# Patient Record
Sex: MALE | Race: WHITE | URBAN - METROPOLITAN AREA
[De-identification: names, ages, dates, MRNs, and addresses within clinical notes are randomized per-mention and may not be internally consistent; named-entity substitution may affect disease eponyms.]

---

## 2018-05-03 ENCOUNTER — OFFICE VISIT (OUTPATIENT)
Dept: URGENT CARE | Facility: URGENT CARE | Age: 38
End: 2018-05-03
Payer: COMMERCIAL

## 2018-05-03 VITALS
TEMPERATURE: 98.7 F | HEART RATE: 70 BPM | WEIGHT: 245.9 LBS | OXYGEN SATURATION: 99 % | SYSTOLIC BLOOD PRESSURE: 149 MMHG | DIASTOLIC BLOOD PRESSURE: 97 MMHG | RESPIRATION RATE: 16 BRPM

## 2018-05-03 DIAGNOSIS — T78.40XA ALLERGIC REACTION, INITIAL ENCOUNTER: Primary | ICD-10-CM

## 2018-05-03 DIAGNOSIS — H02.843 SWOLLEN EYELID, RIGHT: ICD-10-CM

## 2018-05-03 PROCEDURE — 99203 OFFICE O/P NEW LOW 30 MIN: CPT | Performed by: PHYSICIAN ASSISTANT

## 2018-05-03 RX ORDER — FLUTICASONE PROPIONATE 50 MCG
2 SPRAY, SUSPENSION (ML) NASAL DAILY
Qty: 1 BOTTLE | Refills: 1 | Status: SHIPPED | OUTPATIENT
Start: 2018-05-03

## 2018-05-03 RX ORDER — CETIRIZINE HYDROCHLORIDE 10 MG/1
10 TABLET ORAL EVERY EVENING
Qty: 30 TABLET | Refills: 1 | Status: SHIPPED | OUTPATIENT
Start: 2018-05-03

## 2018-05-03 NOTE — MR AVS SNAPSHOT
"              After Visit Summary   5/3/2018    Palmer Marrero    MRN: [id number]           Patient Information     Date Of Birth          1980        Visit Information        Provider Department      5/3/2018 2:10 PM Saúl Zaman PA-C [facility]        Today's Diagnoses     Allergic reaction, initial encounter    -  1    Swollen eyelid, right           Follow-ups after your visit        Who to contact     If you have questions or need follow up information about today's clinic visit or your schedule please contact [facility] directly at [phone].  Normal or non-critical lab and imaging results will be communicated to you by MyChart, letter or phone within 4 business days after the clinic has received the results. If you do not hear from us within 7 days, please contact the clinic through V[employer] or phone. If you have a critical or abnormal lab result, we will notify you by phone as soon as possible.  Submit refill requests through [employer] or call your pharmacy and they will forward the refill request to us. Please allow 3 business days for your refill to be completed.          Additional Information About Your Visit        MyChart Information     [employer] lets you send messages to your doctor, view your test results, renew your prescriptions, schedule appointments and more. To sign up, go to [email or web address] . Click on \"Log in\" on the left side of the screen, which will take you to the Welcome page. Then click on \"Sign up Now\" on the right side of the page.     You will be asked to enter the access code listed below, as well as some personal information. Please follow the directions to create your username and password.     Your access code is: [email or web address]  Expires: 2018  3:15 PM     Your access code will  in 90 days. If you need help or a new code, please call your [place] clinic or [phone].        Care EveryWhere ID     " This is your Care EveryWhere ID. This could be used by other organizations to access your [facility] medical records  [id number]        Your Vitals Were     Pulse Temperature Respirations Pulse Oximetry          70 98.7  F (37.1  C) (Oral) 16 99%         Blood Pressure from Last 3 Encounters:   05/03/18 (!) 149/97    Weight from Last 3 Encounters:   05/03/18 245 lb 14.4 oz (111.5 kg)              Today, you had the following     No orders found for display         Today's Medication Changes          These changes are accurate as of 5/3/18  3:15 PM.  If you have any questions, ask your nurse or doctor.               Start taking these medicines.        Dose/Directions    cetirizine 10 MG tablet   Commonly known as:  zyrTEC   Used for:  Allergic reaction, initial encounter, Swollen eyelid, right   Started by:  Saúl Zaman PA-C        Dose:  10 mg   Take 1 tablet (10 mg) by mouth every evening   Quantity:  30 tablet   Refills:  1       fluticasone 50 MCG/ACT spray   Commonly known as:  FLONASE   Used for:  Allergic reaction, initial encounter, Swollen eyelid, right   Started by:  Saúl Zaman PA-C        Dose:  2 spray   Spray 2 sprays in nostril daily 2 sprays in each nostril qd   Quantity:  1 Bottle   Refills:  1            Where to get your medicines      These medications were sent to [facility] [address] [address]     Phone:  [phone]     cetirizine 10 MG tablet    fluticasone 50 MCG/ACT spray                Primary Care Provider Fax #    Physician No Ref-Primary [phone]       No address on file        Equal Access to Services     [facility] AH: Hadii josefina ku hadasho Soomaali, waaxda luqadaha, qaybta kaalmada adeegyada, rosa diaz. So [facility] [phone].    ATENCIÓN: Si habla español, tiene a tellez disposición servicios gratuitos de asistencia lingüística. Llame al [phone].    We comply with  applicable federal civil rights laws and [place] laws. We do not discriminate on the basis of race, color, national origin, age, disability, sex, sexual orientation, or gender identity.            Thank you!     Thank you for choosing [facility]  for your care. Our goal is always to provide you with excellent care. Hearing back from our patients is one way we can continue to improve our services. Please take a few minutes to complete the written survey that you may receive in the mail after your visit with us. Thank you!             Your Updated Medication List - Protect others around you: Learn how to safely use, store and throw away your medicines at www.disposemymeds.org.          This list is accurate as of 5/3/18  3:15 PM.  Always use your most recent med list.                   Brand Name Dispense Instructions for use Diagnosis    cetirizine 10 MG tablet    zyrTEC    30 tablet    Take 1 tablet (10 mg) by mouth every evening    Allergic reaction, initial encounter, Swollen eyelid, right       fluticasone 50 MCG/ACT spray    FLONASE    1 Bottle    Spray 2 sprays in nostril daily 2 sprays in each nostril qd    Allergic reaction, initial encounter, Swollen eyelid, right       VITAMIN C PO           VITAMIN D (CHOLECALCIFEROL) PO      Take by mouth daily

## 2018-05-03 NOTE — PROGRESS NOTES
SUBJECTIVE:   Palmer Marrero is a 37 year old male presenting with a chief complaint of having itching and swelling of area around the right eye.  Onset of symptoms was 1 day(s) ago.  Course of illness is improving.    Severity mild  Current and Associated symptoms: itching and swelling around eye  Treatment measures tried include none.  Predisposing factors include none.    No past medical history on file.     ALLERGIES   No Known Allergies      Social History   Substance Use Topics     Smoking status: Never Smoker     Smokeless tobacco: Never Used     Alcohol use Not on file       ROS:  CONSTITUTIONAL:NEGATIVE for fever, chills, change in weight  INTEGUMENTARY/SKIN: POSITIVE for itching and swelling around the right eye  EYES: NEGATIVE for vision changes or irritation  ENT/MOUTH: Positive for mild sinus congestion  RESP:NEGATIVE for significant cough or SOB  CV: NEGATIVE for chest pain, palpitations or peripheral edema  MUSCULOSKELETAL: NEGATIVE for significant arthralgias or myalgia  NEURO: NEGATIVE for weakness, dizziness or paresthesias    OBJECTIVE  :BP (!) 149/97  Pulse 70  Temp 98.7  F (37.1  C) (Oral)  Resp 16  Wt 245 lb 14.4 oz (111.5 kg)  SpO2 99%  GENERAL APPEARANCE: healthy, alert and no distress  EYES: EOMI,  PERRL, conjunctiva clear  HENT: ear canals and TM's normal.  Nose and mouth without ulcers, erythema or lesions  NECK: supple, nontender, no lymphadenopathy  RESP: lungs clear to auscultation - no rales, rhonchi or wheezes  CV: regular rates and rhythm, normal S1 S2, no murmur noted  ABDOMEN:  soft, nontender, no HSM or masses and bowel sounds normal  NEURO: Normal strength and tone, sensory exam grossly normal,  normal speech and mentation  SKIN: Positive for mild edema around the right eye    ASSESSMENT/PLAN:      ICD-10-CM    1. Allergic reaction, initial encounter T78.40XA cetirizine (ZYRTEC) 10 MG tablet     fluticasone (FLONASE) 50 MCG/ACT spray   2. Swollen eyelid, right H02.843  cetirizine (ZYRTEC) 10 MG tablet     fluticasone (FLONASE) 50 MCG/ACT spray         cetirizine (ZYRTEC) 10 MG tablet     fluticasone (FLONASE) 50 MCG/ACT spray       Follow up with PCP as needed  See orders in Epic